# Patient Record
Sex: FEMALE | Race: BLACK OR AFRICAN AMERICAN | NOT HISPANIC OR LATINO | Employment: UNEMPLOYED | URBAN - METROPOLITAN AREA
[De-identification: names, ages, dates, MRNs, and addresses within clinical notes are randomized per-mention and may not be internally consistent; named-entity substitution may affect disease eponyms.]

---

## 2024-04-10 ENCOUNTER — APPOINTMENT (OUTPATIENT)
Dept: RADIOLOGY | Facility: HOSPITAL | Age: 54
End: 2024-04-10
Payer: MEDICAID

## 2024-04-10 ENCOUNTER — HOSPITAL ENCOUNTER (EMERGENCY)
Facility: HOSPITAL | Age: 54
Discharge: HOME | End: 2024-04-10
Attending: EMERGENCY MEDICINE
Payer: MEDICAID

## 2024-04-10 VITALS
DIASTOLIC BLOOD PRESSURE: 87 MMHG | TEMPERATURE: 98.6 F | OXYGEN SATURATION: 96 % | HEART RATE: 75 BPM | RESPIRATION RATE: 18 BRPM | SYSTOLIC BLOOD PRESSURE: 132 MMHG

## 2024-04-10 DIAGNOSIS — K02.9 DENTAL CARIES: Primary | ICD-10-CM

## 2024-04-10 DIAGNOSIS — J01.20 ACUTE NON-RECURRENT ETHMOIDAL SINUSITIS: ICD-10-CM

## 2024-04-10 DIAGNOSIS — S05.02XA ABRASION OF LEFT CONJUNCTIVA, INITIAL ENCOUNTER: ICD-10-CM

## 2024-04-10 DIAGNOSIS — J01.00 ACUTE NON-RECURRENT MAXILLARY SINUSITIS: ICD-10-CM

## 2024-04-10 LAB
ALBUMIN SERPL BCP-MCNC: 4.3 G/DL (ref 3.4–5)
ALP SERPL-CCNC: 74 U/L (ref 33–110)
ALT SERPL W P-5'-P-CCNC: 9 U/L (ref 7–45)
ANION GAP SERPL CALC-SCNC: 14 MMOL/L (ref 10–20)
AST SERPL W P-5'-P-CCNC: 15 U/L (ref 9–39)
BASOPHILS # BLD AUTO: 0.04 X10*3/UL (ref 0–0.1)
BASOPHILS NFR BLD AUTO: 0.4 %
BILIRUB SERPL-MCNC: 0.7 MG/DL (ref 0–1.2)
BUN SERPL-MCNC: 7 MG/DL (ref 6–23)
CALCIUM SERPL-MCNC: 9.5 MG/DL (ref 8.6–10.6)
CHLORIDE SERPL-SCNC: 101 MMOL/L (ref 98–107)
CO2 SERPL-SCNC: 25 MMOL/L (ref 21–32)
CREAT SERPL-MCNC: 0.58 MG/DL (ref 0.5–1.05)
EGFRCR SERPLBLD CKD-EPI 2021: >90 ML/MIN/1.73M*2
EOSINOPHIL # BLD AUTO: 0.13 X10*3/UL (ref 0–0.7)
EOSINOPHIL NFR BLD AUTO: 1.2 %
ERYTHROCYTE [DISTWIDTH] IN BLOOD BY AUTOMATED COUNT: 12 % (ref 11.5–14.5)
GLUCOSE SERPL-MCNC: 87 MG/DL (ref 74–99)
HCT VFR BLD AUTO: 38.5 % (ref 36–46)
HGB BLD-MCNC: 13.2 G/DL (ref 12–16)
IMM GRANULOCYTES # BLD AUTO: 0.03 X10*3/UL (ref 0–0.7)
IMM GRANULOCYTES NFR BLD AUTO: 0.3 % (ref 0–0.9)
LYMPHOCYTES # BLD AUTO: 2.47 X10*3/UL (ref 1.2–4.8)
LYMPHOCYTES NFR BLD AUTO: 23.7 %
MCH RBC QN AUTO: 30.3 PG (ref 26–34)
MCHC RBC AUTO-ENTMCNC: 34.3 G/DL (ref 32–36)
MCV RBC AUTO: 88 FL (ref 80–100)
MONOCYTES # BLD AUTO: 1.02 X10*3/UL (ref 0.1–1)
MONOCYTES NFR BLD AUTO: 9.8 %
NEUTROPHILS # BLD AUTO: 6.72 X10*3/UL (ref 1.2–7.7)
NEUTROPHILS NFR BLD AUTO: 64.6 %
NRBC BLD-RTO: 0 /100 WBCS (ref 0–0)
PLATELET # BLD AUTO: 350 X10*3/UL (ref 150–450)
POTASSIUM SERPL-SCNC: 3.8 MMOL/L (ref 3.5–5.3)
PROT SERPL-MCNC: 8.1 G/DL (ref 6.4–8.2)
RBC # BLD AUTO: 4.36 X10*6/UL (ref 4–5.2)
SODIUM SERPL-SCNC: 136 MMOL/L (ref 136–145)
WBC # BLD AUTO: 10.4 X10*3/UL (ref 4.4–11.3)

## 2024-04-10 PROCEDURE — 85025 COMPLETE CBC W/AUTO DIFF WBC: CPT

## 2024-04-10 PROCEDURE — 96365 THER/PROPH/DIAG IV INF INIT: CPT

## 2024-04-10 PROCEDURE — 70487 CT MAXILLOFACIAL W/DYE: CPT

## 2024-04-10 PROCEDURE — 2500000004 HC RX 250 GENERAL PHARMACY W/ HCPCS (ALT 636 FOR OP/ED): Mod: SE

## 2024-04-10 PROCEDURE — 36415 COLL VENOUS BLD VENIPUNCTURE: CPT

## 2024-04-10 PROCEDURE — 99285 EMERGENCY DEPT VISIT HI MDM: CPT | Performed by: EMERGENCY MEDICINE

## 2024-04-10 PROCEDURE — 70487 CT MAXILLOFACIAL W/DYE: CPT | Performed by: RADIOLOGY

## 2024-04-10 PROCEDURE — 2500000001 HC RX 250 WO HCPCS SELF ADMINISTERED DRUGS (ALT 637 FOR MEDICARE OP)

## 2024-04-10 PROCEDURE — 80053 COMPREHEN METABOLIC PANEL: CPT

## 2024-04-10 PROCEDURE — 2550000001 HC RX 255 CONTRASTS: Performed by: EMERGENCY MEDICINE

## 2024-04-10 PROCEDURE — 99284 EMERGENCY DEPT VISIT MOD MDM: CPT | Mod: 25

## 2024-04-10 RX ORDER — DIPHENHYDRAMINE HCL 25 MG
50 CAPSULE ORAL ONCE
Status: COMPLETED | OUTPATIENT
Start: 2024-04-10 | End: 2024-04-10

## 2024-04-10 RX ORDER — AMOXICILLIN AND CLAVULANATE POTASSIUM 875; 125 MG/1; MG/1
1 TABLET, FILM COATED ORAL EVERY 12 HOURS
Qty: 14 TABLET | Refills: 0 | Status: SHIPPED | OUTPATIENT
Start: 2024-04-10 | End: 2024-04-17

## 2024-04-10 RX ORDER — ERYTHROMYCIN 5 MG/G
OINTMENT OPHTHALMIC NIGHTLY
Qty: 3.5 G | Refills: 0 | Status: SHIPPED | OUTPATIENT
Start: 2024-04-10 | End: 2024-04-20

## 2024-04-10 RX ORDER — CHLORHEXIDINE GLUCONATE ORAL RINSE 1.2 MG/ML
15 SOLUTION DENTAL EVERY 12 HOURS
Qty: 120 ML | Refills: 0 | Status: SHIPPED | OUTPATIENT
Start: 2024-04-10 | End: 2024-04-17

## 2024-04-10 RX ORDER — OXYCODONE AND ACETAMINOPHEN 5; 325 MG/1; MG/1
1 TABLET ORAL ONCE
Status: COMPLETED | OUTPATIENT
Start: 2024-04-10 | End: 2024-04-10

## 2024-04-10 RX ORDER — ACETAMINOPHEN 325 MG/1
650 TABLET ORAL EVERY 6 HOURS PRN
Qty: 56 TABLET | Refills: 0 | Status: SHIPPED | OUTPATIENT
Start: 2024-04-10 | End: 2024-04-24

## 2024-04-10 RX ORDER — TETRACAINE HYDROCHLORIDE 5 MG/ML
1 SOLUTION OPHTHALMIC ONCE
Status: COMPLETED | OUTPATIENT
Start: 2024-04-10 | End: 2024-04-10

## 2024-04-10 RX ADMIN — AMPICILLIN SODIUM AND SULBACTAM SODIUM 3 G: 2; 1 INJECTION, POWDER, FOR SOLUTION INTRAMUSCULAR; INTRAVENOUS at 22:16

## 2024-04-10 RX ADMIN — OXYCODONE HYDROCHLORIDE AND ACETAMINOPHEN 1 TABLET: 5; 325 TABLET ORAL at 17:21

## 2024-04-10 RX ADMIN — TETRACAINE HYDROCHLORIDE 1 DROP: 5 SOLUTION OPHTHALMIC at 17:50

## 2024-04-10 RX ADMIN — IOHEXOL 75 ML: 350 INJECTION, SOLUTION INTRAVENOUS at 19:22

## 2024-04-10 RX ADMIN — FLUORESCEIN SODIUM 1 STRIP: 1 STRIP OPHTHALMIC at 17:50

## 2024-04-10 RX ADMIN — DIPHENHYDRAMINE HYDROCHLORIDE 50 MG: 25 CAPSULE ORAL at 17:21

## 2024-04-10 ASSESSMENT — COLUMBIA-SUICIDE SEVERITY RATING SCALE - C-SSRS
2. HAVE YOU ACTUALLY HAD ANY THOUGHTS OF KILLING YOURSELF?: NO
6. HAVE YOU EVER DONE ANYTHING, STARTED TO DO ANYTHING, OR PREPARED TO DO ANYTHING TO END YOUR LIFE?: NO
1. IN THE PAST MONTH, HAVE YOU WISHED YOU WERE DEAD OR WISHED YOU COULD GO TO SLEEP AND NOT WAKE UP?: NO

## 2024-04-10 ASSESSMENT — VISUAL ACUITY
OU: 20/20
OD: 20/20
OS: 20/20

## 2024-04-10 ASSESSMENT — TONOMETRY
IOP_HANDHELD: 1
OS_IOP_MMHG: 23
OD_IOP_MMHG: 30

## 2024-04-10 NOTE — ED PROVIDER NOTES
CC: Dental Pain and Rash     History provided by: Patient  Limitations to History: None    HPI:  Patient is a 51-year-old female with a PMH of hypertension and alcohol use disorder who presents to the emergency department for a chief complaint of dental pain and rash.  Patient reports that when she awoke this morning she noticed that her left eye was itchy and red.  The left side of her face also has some swelling.  She called the urgent care nurse hotline and was told to come to the emergency department.  The patient denies fever, chills, chest pain, shortness of breath, abdominal pain, difficulty tolerating oral secretions, or difficulty swallowing.  Patient denies any new foods or facial products and was feeling fine last night when she went to bed without these symptoms.    External Records Reviewed: Previous ED records, inpatient records, and outpatient records  ???????????????????????????????????????????????????????????????  Triage Vitals:  T 36.9 °C (98.4 °F)  HR 76  /90  RR 16  O2 98 % None (Room air)    Physical Exam  Constitutional:       General: She is awake. She is not in acute distress.     Appearance: She is not ill-appearing, toxic-appearing or diaphoretic.   HENT:      Right Ear: Tympanic membrane, ear canal and external ear normal.      Left Ear: Tympanic membrane, ear canal and external ear normal.      Ears:      Comments: No pain to the left ear.     Mouth/Throat:      Lips: Pink.      Mouth: Mucous membranes are moist.        Comments: Several dental caries as above.  No evidence of periapical abscess.  Oropharynx not well-visualized due to patient not opening her mouth enough due to pain.  Eyes:      Intraocular pressure: Right eye pressure is 30 mmHg. Left eye pressure is 23 mmHg. Measurements were taken using a handheld tonometer.     Extraocular Movements: Extraocular movements intact.      Conjunctiva/sclera:      Left eye: Left conjunctiva is injected.      Pupils: Pupils are  equal, round, and reactive to light.      Left eye: Fluorescein uptake present. Mika exam negative.     Comments: Fluorescein uptake to lower conjunctival and eyelid  in a linear distribution.  No [place] sign present.   Skin:     General: Skin is warm and dry.      Capillary Refill: Capillary refill takes less than 2 seconds.      Comments: Sandpaper like rash to left side of face.    Neurological:      Mental Status: She is alert.   Psychiatric:         Behavior: Behavior is cooperative.        ???????????????????????????????????????????????????????????????  ED Course/Treatment/Medical Decision Making        Independent Interpretation of Studies:  I independently interpreted: CT maxillofacial bones    Differential diagnoses considered include but ar not limited to: Dental caries, deep space neck infection, sinusitis, acute angle-closure glaucoma, conjunctivitis, keratitis, corneal abrasion, conjunctival abrasion, allergic reaction, herpes zoster     Social Determinants Limiting Care:  None identified         ED Course:  Diagnoses as of 04/11/24 1558   Dental caries   Abrasion of left conjunctiva, initial encounter   Acute non-recurrent maxillary sinusitis   Acute non-recurrent ethmoidal sinusitis       MDM:  Patient is a 53-year-old female with above PMH who presents to the emergency department for chief complaint of rash and dental pain.  Upon arrival patient's vital signs within normal limits, she is nontoxic-appearing, and appears in no acute distress.  Upon examination patient does have several dental caries and significant left-sided facial swelling concerning for deep space infection.  Therefore a CT maxillofacial with IV contrast has been ordered.  IOP is normal in both eyes therefore low concern for acute angle-closure glaucoma.  Eye fluorescent staining remarkable for conjunctival abrasions but no dendritic lesions.  Visual acuity is within normal limits.  Patient's pain and itching will be treated with  Percocet and Benadryl.  CT maxillofacial remarkable for odontogenic infection of maxillary teeth with extension into maxillary and ethmoid sinuses.  OMFS has been consulted.  The patient be started on IV Unasyn.  OMFS recommends outpatient follow-up tomorrow morning to pull the infected teeth.  The patient will be given a prescription for erythromycin ophthalmic ointment, Augmentin, and was instructed on how to follow-up with OMFS tomorrow morning.  Patient was discharged home in stable condition.    Impression:  Dental caries  Abrasion of left conjunctival  Maxillary and ethmoid sinusitis    Disposition:  Discharged home in stable condition    Patient staffed discussed with ED attending Dr. HENRIQUE Grande, DO   Emergency Medicine, PGY-1       Procedures ? SmartLinks last updated 4/10/2024 4:48 PM          Tobin Grande DO  Resident  04/12/24 [id number]

## 2024-04-10 NOTE — Clinical Note
Juliann Turner was seen and treated in our emergency department on 4/10/2024.  She may return to work on 04/11/2024.       If you have any questions or concerns, please don't hesitate to call.      Tobin Grande, DO

## 2024-04-11 NOTE — DISCHARGE INSTRUCTIONS
You were diagnosed today in the emergency department with a conjunctival abrasion, maxillary and ethmoid sinusitis, and a dental infection due to dental cavities.  Please take the Augmentin, Tylenol, and erythromycin ointment as prescribed.    Please follow-up with [facility] oral and maxillofacial surgery tomorrow at 9 AM.    The Oral & Maxillofacial Surgery clinic is located on the first floor within the [facility] ([address] [place] [address]).  Our office phone number for patients is [phone]. Patients can contact the [email or web address] through the hospital  [phone] and request to talk to oral surgery resident on call.

## 2024-04-11 NOTE — CONSULTS
Reason For Consult  Dental pain    History Of Present Illness  Juliann Turner is a 53 y.o. female presenting with left facial swelling and left side dental pain. The patient reported that she woke up this morning with swelling on the left side of her face. The patient reported that she has chronic dental pain on the left side     Past Medical History  She has a past medical history of Atypical squamous cells of undetermined significance on cytologic smear of cervix (ASC-US) (2013), Elevated blood-pressure reading, without diagnosis of hypertension (2013), and Other conditions influencing health status (2013).    Surgical History  She has a past surgical history that includes Other surgical history (2015);  section, classic (2015); and Other surgical history (2015).     Social History  She has no history on file for tobacco use, alcohol use, and drug use.    Family History  No family history on file.     Allergies  Bee pollen, Latex, and Nsaids (non-steroidal anti-inflammatory drug)    Review of Systems  ROS negative except mentioned in HPI      Physical Exam  .Physical Exam (notable positive and negative findings are bolded)    CONSTITUTIONAL/GENERAL:  Well appearing. No acute distress.     HEAD:  left side facial swelling. Tender to palpation    EYES:  EOM intact, sclera normal, without conjunctival erythema or drainage.    EARS:  Normal external ears. Normal hearing to whispered voice and rubbed fingers.    NOSE:  External nose midline, anterior rhinoscopy is normal with limited visualization to the anterior aspect of the interior turbinates, no bleeding or drainage, no lesions, septum without deviation or distention. Sinuses non-tender to palpation bilaterally.    ORAL CAVITY/OROPHARYNX/LIPS:  Poor oral hygiene, Multiple carious teeth. Normal and moist mucous membranes. Maximal incisal opening ~35mm.  Paryngeal walls without massess or lesions. Tonsils without erythema.  Uvula symmetric. Patient tolerating own secretions. Mild left maxillary buccal vestibule swelling.     NECK/LMYPH/GLANDS: Parotid and submandibular glands without edema or tenderness bilaterally. Patient tolerating oral secretions without issue. No lymphadenopathy of head or neck. Neck full range of motion without tenderness or posturing. Thyroid and trachea midline without masses.     RESPIRATION/VOICE:  Breathing comfortably on room air. No hoarseness, wheezing, stridor, or other abnormality. Ausculatation of lungs clear bilaterally without crackles or rhonchi.    CARDIOVASCULAR:  Regular rate and rhythm.    NEURO:  Awake and alert. Oriented to person, place, and time.  Cranial nerves II-XII grossly intact and symmetric bilaterally. No anesthesia or paresthesia of CN V bilaterally. CN VII without defects, patient showing full facial range of expression.    PSYCH:  Patient was anxious and mildly agitated.      Last Recorded Vitals  Blood pressure 162/90, pulse 76, temperature 36.9 °C (98.4 °F), temperature source Temporal, resp. rate 16, SpO2 98%.    Relevant Results  CT facial bone with contrast IMPRESSION:  1. Heterogenous complete opacification of the left maxillary  sinus/left ethmoid air cells with internal curvilinear hyperdense  attenuation. Although nonspecific, in the setting of the associated  periapical lucencies involving two left maxillary molars, these  findings may represent extension of an odontogenic infection.  Additional considerations include inspissated secretions, blood  products, or noninvasive fungal elements. There is associated soft  tissue stranding/edema within the subcutaneous tissues overlying the  left maxilla.  2. No acute facial bone fracture visualized.           Assessment/Plan     Juliann Turner is a 53 y.o. female presenting with left facial swelling and left side dental pain. The patient likely having acute odontogenic infection. Discussed with the patient that there is no obvious  fluid collection on the left side of the face despite her swelling. Based on clinical and radiographic findings, recommended the patient follow-up at [facility] outpatient clinic for extraction of the infected teeth (likely # 12,14,15.    Recs:   - Augmentin 875/125 mg for 7 days   - Chlorhexidine 0.12% oral rinse for 7 days   - The patient to follow-up at [facility] outpatient clinic tomorrow at 9 AM for extraction of indicated teeth. The patient was informed that she has to wait until we have available chair for her   - Pain medication per ED physician       Omero Camargo, IVAN  Oral and maxillofacial surgery intern   Pager [phone]     .The Oral & Maxillofacial Surgery clinic is located on the first floor within the [facility] ([address] [place] [address]).  Our office phone number for patients is [phone]. Patients can contact the [email or web address] through the hospital  [phone] and request to talk to oral surgery resident on call.